# Patient Record
Sex: MALE | Race: WHITE
[De-identification: names, ages, dates, MRNs, and addresses within clinical notes are randomized per-mention and may not be internally consistent; named-entity substitution may affect disease eponyms.]

---

## 2017-09-03 ENCOUNTER — HOSPITAL ENCOUNTER (EMERGENCY)
Dept: HOSPITAL 62 - ER | Age: 27
Discharge: HOME | End: 2017-09-03
Payer: MEDICAID

## 2017-09-03 VITALS — SYSTOLIC BLOOD PRESSURE: 109 MMHG | DIASTOLIC BLOOD PRESSURE: 61 MMHG

## 2017-09-03 DIAGNOSIS — M25.512: Primary | ICD-10-CM

## 2017-09-03 DIAGNOSIS — F17.200: ICD-10-CM

## 2017-09-03 PROCEDURE — 99283 EMERGENCY DEPT VISIT LOW MDM: CPT

## 2017-09-03 PROCEDURE — 73030 X-RAY EXAM OF SHOULDER: CPT

## 2017-09-03 PROCEDURE — 96372 THER/PROPH/DIAG INJ SC/IM: CPT

## 2017-09-03 NOTE — ER DOCUMENT REPORT
HPI





- HPI


Pain Level: 4


Notes: 





Patient is a 27-year-old male who presents the ED complaining of left shoulder 

pain 1 week.  Patient states that he woke up and he was having trouble moving 

his arm.  Patient denies any injury or neck pain.  Patient states that he is 

able to move his shoulder minimally, but has the most difficulty with 

abduction.  Patient states that he has no problems from his elbow distal.  

Patient states that he is a pain when he presses near his deltoid.  He has not 

noticed any swelling or bruising to the area.  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 

cement for a living and has been having difficulties with that over the last 

week.  Patient denies any insect bites or recent illness.  He does admit to 

smoking, but denies any IV drug use.  Denies any headache, fever, neck pain, URI

, sore throat, chest pain, palpitations, syncope, cough, shortness of breath, 

wheeze, dyspnea, abdominal pain, nausea/vomiting/diarrhea, urinary retention, 

dysuria, hematuria, numbness/tingling, saddle anesthesia, or rash.





- ROS


Notes: 





REVIEW OF SYSTEMS:


CONSTITUTIONAL :  Denies fever,  chills, or sweats.  Denies recent illness.


EENT:   Denies eye, ear, throat, or mouth pain or symptoms.  Denies nasal or 

sinus congestion or discharge.  Denies throat, tongue, or mouth swelling or 

difficulty swallowing.


CARDIOVASCULAR:  Denies chest pain.  Denies palpitations or racing or irregular 

heart beat.  Denies ankle edema.


RESPIRATORY:  Denies cough, cold, or chest congestion.  Denies shortness of 

breath, difficulty breathing, or wheezing.


GASTROINTESTINAL:  Denies abdominal pain or distention.  Denies nausea, vomiting

, or diarrhea.  Denies blood in vomitus, stools, or per rectum.  Denies black, 

tarry stools.  Denies constipation.  


GENITOURINARY:  Denies difficulty urinating, painful urination, burning, 

frequency, blood in urine, or discharge.


MUSCULOSKELETAL:  see hpi


SKIN:   Denies rash, lesions or sores.


NEUROLOGICAL:  Denies confusion or altered mental status.  Denies passing out 

or loss of consciousness.  Denies dizziness or lightheadedness.  Denies 

headache.  Denies weakness or paralysis or loss of use of either side.  Denies 

problems with gait or speech.  Denies sensory loss, numbness, or tingling.  





ALL OTHER SYSTEMS REVIEWED AND NEGATIVE.





Dictation was performed using Dragon voice recognition software





- DERM


Skin Color: Normal





Past Medical History





- Social History


Smoking Status: Current Every Day Smoker


Family History: DM


Patient has suicidal ideation: No


Patient has homicidal ideation: No


Renal/ Medical History: Denies: Hx Peritoneal Dialysis





- Immunizations


Hx Diphtheria, Pertussis, Tetanus Vaccination: No





Vertical Provider Document





- CONSTITUTIONAL


Agree With Documented VS: Yes


Notes: 


PHYSICAL EXAMINATION:





GENERAL: Well-appearing, well-nourished and in no acute distress.





HEAD: Atraumatic, normocephalic.





EYES: Pupils equal round and reactive to light, extraocular movements intact, 

sclera anicteric, conjunctiva are normal.





ENT: EAC clear b/l.  TM's intact b/l without erythema, fluid, or perforation.  

Nares patent and without discharge.  oropharynx clear without exudates.  No 

tonsilar hypertrophy or erythema.  Moist mucous membranes.  No sinus tenderness.





NECK: Normal range of motion, supple without lymphadenopathy.  No rigidity/

meningismus.  Non-tender.  Spurling negative.





LUNGS: Breath sounds clear to auscultation bilaterally and equal.  No wheezes 

rales or rhonchi.





HEART: Regular rate and rhythm without murmurs, rubs, gallops.





Musculoskeletal: Lt shoulder:  FROM to passive.  Pt not even trying to contract 

his deltoid for active ROM, but he can flex his bicep w/full strength and reach 

behind himself and touch the mid back.  His arm does Abduct when he starts to 

reach for his midback indicating that he has the ability to at least minimally 

contract his deltoid.  No RC deficit, but pt did not keep his arm in abduction 

for empty can test.  + mild tenderness to the deltoid bursa area.  No other 

bony tenderness.  No clicking/locking in the shoulder appreciated.  Reflexes 2+ 

b/l.  N/V intact distal.  No deficits or issues from the elbow-distal.  





Extremities:  No cyanosis, clubbing, or edema b/l.  Peripheral pulses 2+.  

Capillary refill less than 3 seconds.





NEUROLOGICAL: Normal speech, normal gait.  Normal sensory, motor exams 





PSYCH: Normal mood, normal affect.





SKIN: Warm, Dry, normal turgor, no rashes or lesions noted.








- INFECTION CONTROL


TRAVEL OUTSIDE OF THE [place] IN LAST 30 DAYS: No





- RESPIRATORY


O2 Sat by Pulse Oximetry: 100





Course





- Re-evaluation


Re-evalutation: 





09/03/17 17:37


Patient is an afebrile, well-hydrated, 27-year-old male who presents the ED 

with left shoulder pain, suspect deltoid bursitis at this time based on H&P.  

Vitals are stable.  PE otherwise unremarkable.  Left shoulder x-ray was 

unremarkable for any acute pathology at this time.  Refer to patient's exam.  I 

suspect that he is able to move his shoulder more than he is willing to do in 

the exam today.  Patient only had point tenderness at the deltoid bursa.  I 

have a low suspicion for any septic joint, tendon rupture, cervical etiology, 

disc herniation causing severe spinal stenosis, ACS, PE, dissection, 

pneumothorax, spinal abscess, meningitis, sepsis, or other emergent systemic 

condition at this time.  Patient is aware that his condition can change from 

initial presentation and he needs to monitor symptoms closely and seek medical 

attention if any acute changes.  I will send him home with a temporary sling to 

use as directed.  Conservative measures otherwise for symptoms.  Recheck with 

your PCM this week.  Consider consult with orthopedics/physical therapy for 

further evaluation.  Return to the ED with any worsening/concerning symptoms 

otherwise as reviewed in discharge.  Patient is in agreement.











- Vital Signs


Vital signs: 


 











Temp Pulse Resp BP Pulse Ox


 


 97.7 F   80   16   109/61   100 


 


 09/03/17 16:03  09/03/17 16:03  09/03/17 16:03  09/03/17 16:03  09/03/17 16:03














Discharge





- Discharge


Clinical Impression: 


Left shoulder pain


Qualifiers:


 Chronicity: acute Qualified Code(s): M25.512 - Pain in left shoulder





Condition: Stable


Disposition: HOME, SELF-CARE


Instructions:  Ice & Elevation ([facility]), Exercise Program for the Shoulder ([facility]), 

Temporary Sling ([facility]), Warm Packs ([facility])


Additional Instructions: 


Rest, Ice, Compression, Elevation


Use sling as directed


Tylenol/ibuprofen as needed


Light stretches daily


Strength exercises as able


Moist heat and massage may help


F/u with your PCP in 2-3 days for a recheck


Consider consult(s) with Orthopedics/physical therapy for ongoing/worsening 

symptoms





Return to the ED with any worsening symptoms and/or development of fever, 

headache, chest pain, palpitations, syncope, shortness of breath, trouble 

breathing, abdominal pain, n/v/d, muscle weakness/paralysis, numbness/tingling, 

swelling, redness, or other worsening symptoms that are concerning to you.


Forms:  Smoking Cessation Education


Referrals: 


[facility] [Provider Group] - 09/26/17

## 2017-09-03 NOTE — RADIOLOGY REPORT (SQ)
EXAM DESCRIPTION:  SHOULDER LEFT 2 OR MORE VIEWS



COMPLETED DATE/TIME:  9/3/2017 5:13 pm



REASON FOR STUDY:  left shoulder pain



COMPARISON:  None.



NUMBER OF VIEWS:  Three views.



TECHNIQUE:  Internal rotation, external rotation, and Y view images acquired of the left shoulder.



LIMITATIONS:  None.



FINDINGS:  MINERALIZATION: Normal.

BONES: No acute fracture or dislocation. No worrisome bone lesions. No significant osteophytes.

GLENOHUMERAL JOINT: No significant findings.

ACROMIOCLAVICULAR JOINT: No large osteophytes.

SOFT TISSUES: No calcifications.

VISUALIZED RIBS, SPINE, AND LUNG: No other significant finding.

OTHER: No other significant finding.



IMPRESSION:  NEGATIVE STUDY OF THE LEFT SHOULDER. NO RADIOGRAPHIC EVIDENCE OF ACUTE INJURY. NO EXPLAN
ATION FOR PAIN.



TECHNICAL DOCUMENTATION:  JOB ID:  [id number]

 2011 [facility]- All Rights Reserved

## 2017-10-18 ENCOUNTER — HOSPITAL ENCOUNTER (EMERGENCY)
Dept: HOSPITAL 62 - ER | Age: 27
LOS: 1 days | Discharge: ANOTHER HOSPITAL | End: 2017-10-19
Payer: COMMERCIAL

## 2017-10-18 VITALS — SYSTOLIC BLOOD PRESSURE: 130 MMHG | DIASTOLIC BLOOD PRESSURE: 65 MMHG

## 2017-10-18 DIAGNOSIS — Z88.5: ICD-10-CM

## 2017-10-18 DIAGNOSIS — M65.9: Primary | ICD-10-CM

## 2017-10-18 DIAGNOSIS — Z91.040: ICD-10-CM

## 2017-10-18 DIAGNOSIS — M79.644: ICD-10-CM

## 2017-10-18 PROCEDURE — 96376 TX/PRO/DX INJ SAME DRUG ADON: CPT

## 2017-10-18 PROCEDURE — 99284 EMERGENCY DEPT VISIT MOD MDM: CPT

## 2017-10-18 PROCEDURE — 96375 TX/PRO/DX INJ NEW DRUG ADDON: CPT

## 2017-10-18 PROCEDURE — 96365 THER/PROPH/DIAG IV INF INIT: CPT

## 2017-10-18 NOTE — ER DOCUMENT REPORT
ED Extremity Problem, Upper





- General


Chief Complaint: Hand Swelling


Stated Complaint: FINGER PAIN/SWELLING


Time Seen by Provider: 10/18/17 22:20


Notes: 


Patient is a 27-year-old male comes emergency department for chief complaint of 

swollen and painful right fifth finger, he states that 4 days ago there was 

what appeared to be a pocket of yellow over the side of his finger, he tried to 

cut this open with a knife, a small amount of pus came out, he states since 

that time the finger has become progressively red, swollen, painful, and now he 

cannot straighten the finger.  Now he has pain and swelling to his hand and 

pain shooting up his arm towards his right elbow.  He is up-to-date on his 

tetanus.  He denies any daily medications or surgeries.


TRAVEL OUTSIDE OF THE [place] IN LAST 30 DAYS: No





- Related Data


Allergies/Adverse Reactions: 


 





latex [Latex] Allergy (Intermediate, Verified 11/03/12 10:56)


 


morphine [Morphine] Allergy (Verified 11/03/12 10:56)


 











Past Medical History





- General


Information source: Patient





- Social History


Smoking Status: Never Smoker


Frequency of alcohol use: None


Drug Abuse: None


Lives with: Family


Family History: DM


Patient has suicidal ideation: No


Patient has homicidal ideation: No





- Medical History


Medical History: Negative


Renal/ Medical History: Denies: Hx Peritoneal Dialysis


Surgical Hx: Negative





- Immunizations


Immunizations up to date: Yes


Hx Diphtheria, Pertussis, Tetanus Vaccination: Yes





Review of Systems





- Review of Systems


Constitutional: No symptoms reported


EENT: No symptoms reported


Cardiovascular: No symptoms reported


Respiratory: No symptoms reported


Gastrointestinal: No symptoms reported


Genitourinary: No symptoms reported


Male Genitourinary: No symptoms reported


Musculoskeletal: See HPI


Skin: See HPI


Hematologic/Lymphatic: No symptoms reported


Neurological/Psychological: No symptoms reported





Physical Exam





- Vital signs


Vitals: 


 











Temp Pulse Resp BP Pulse Ox


 


 98.7 F   89   18   123/69   98 


 


 10/18/17 21:10  10/18/17 21:10  10/18/17 21:10  10/18/17 21:10  10/18/17 21:10











Interpretation: Normal





- General


General appearance: Alert, Anxious


In distress: None





- HEENT


Head: Normocephalic, Atraumatic


Eyes: Normal


Pupils: PERRL





- Respiratory


Respiratory status: No respiratory distress


Chest status: Nontender


Breath sounds: Normal


Chest palpation: Normal





- Cardiovascular


Rhythm: Regular


Heart sounds: Normal auscultation


Murmur: No





- Abdominal


Inspection: Normal


Distension: No distension


Bowel sounds: Normal


Tenderness: Nontender


Organomegaly: No organomegaly





- Back


Back: Normal, Nontender





- Extremities


General upper extremity: Other - Erythema and swelling over the entire right 

fifth digit, small scab over the side of the finger just distal to the DIP 

location, finger is in flexion, large amount of pain with palpation or movement 

of the area, sensation and capillary refill intact, tenderness extends to the 

palm of the hand with questionable swelling over the palm of the hand as well.  

Normal wrist, elbow, upper extremity exam otherwise.


General lower extremity: Normal inspection, Nontender, Normal ROM, Normal 

strength





- Neurological


Neuro grossly intact: Yes


Cognition: Normal


Orientation: AAOx4


[place] Coma Scale Eye Opening: Spontaneous


[place] Coma Scale Verbal: Oriented


[place] Coma Scale Motor: Obeys Commands


[place] Coma Scale Total: 15


Speech: Normal


Motor strength normal: LUE, RUE, LLE, RLE


Sensory: Normal





- Psychological


Associated symptoms: Normal affect, Normal mood





- Skin


Skin Temperature: Warm


Skin Moisture: Dry


Skin Color: Normal





Course





- Re-evaluation


Re-evalutation: 


Examination is very concerning for flexor tenosynovitis with partial flexion, 

severe pain, swelling, redness, pain with passive range of motion.  Patient 

kept n.p.o., will be given dose of antibiotics, will consult orthopedics. 

Discussed with Dr. Rod.





10/18/17 22:45


No Orthopedics on call. Called [facility], pending call back.





Spoke with Dr. rao, orthopedic surgeon, recommendation is to have the 

patient accepted at [facility] where he will be transferred [facility] 

pending probable surgery tomorrow.  I spoke with patient in detail in regards 

to this, patient and significant other are in full agreement with this plan.





10/19/17 00:18


Patient was reassessed, asking for additional pain medication but otherwise is 

unchanged.  Examination of the hand is unchanged.  We have a room assignment, 

transport close by, stable for transport.





10/19/17 01:36


EMS is here, evaluated again at bedside, patient is much more comfortable, 

patient stable for transfer.





- Vital Signs


Vital signs: 


 











Temp Pulse Resp BP Pulse Ox


 


 98.6 F   80   18   130/65 H  99 


 


 10/19/17 00:51  10/18/17 23:32  10/19/17 00:51  10/18/17 23:32  10/19/17 00:51














Discharge





- Discharge


Clinical Impression: 


 Flexor tenosynovitis of finger





Condition: Stable


Disposition: [facility]

## 2018-02-01 ENCOUNTER — HOSPITAL ENCOUNTER (EMERGENCY)
Dept: HOSPITAL 62 - ER | Age: 28
LOS: 1 days | Discharge: HOME | End: 2018-02-02
Payer: COMMERCIAL

## 2018-02-01 DIAGNOSIS — L03.116: ICD-10-CM

## 2018-02-01 DIAGNOSIS — Z91.040: ICD-10-CM

## 2018-02-01 DIAGNOSIS — F17.200: ICD-10-CM

## 2018-02-01 DIAGNOSIS — Z86.14: ICD-10-CM

## 2018-02-01 DIAGNOSIS — Z88.6: ICD-10-CM

## 2018-02-01 DIAGNOSIS — L02.416: Primary | ICD-10-CM

## 2018-02-01 PROCEDURE — 99282 EMERGENCY DEPT VISIT SF MDM: CPT

## 2018-02-01 NOTE — ER DOCUMENT REPORT
ED Skin Rash/Insect Bite/Abscs





- General


Chief Complaint: Abscess


Stated Complaint: LEG PAIN


Time Seen by Provider: 02/01/18 23:32


Mode of Arrival: Ambulatory


Information source: Patient


Notes: 





  Patient is a 27-year-old maleWith a history of MRSA who presents to the ER 

today for pain, swelling, redness and drainage above the left knee to the left 

lateral thigh 3 days.  Patient states that it is draining clear fluid.  He 

denies any fevers or chills.  He states that he feels like the swelling has 

extended into his calf.


TRAVEL OUTSIDE OF THE [place] IN LAST 30 DAYS: No





- Related Data


Allergies/Adverse Reactions: 


 





latex [Latex] Allergy (Intermediate, Verified 02/01/18 21:11)


 


morphine [Morphine] Allergy (Verified 02/01/18 21:11)


 











Past Medical History





- General


Information source: Patient





- Social History


Smoking Status: Current Every Day Smoker


Family History: DM


Renal/ Medical History: Denies: Hx Peritoneal Dialysis





- Immunizations


Immunizations up to date: Yes


Hx Diphtheria, Pertussis, Tetanus Vaccination: Yes





Review of Systems





- Review of Systems


Constitutional: No symptoms reported


EENT: No symptoms reported


Cardiovascular: No symptoms reported


Respiratory: No symptoms reported


Gastrointestinal: No symptoms reported


Genitourinary: No symptoms reported


Male Genitourinary: No symptoms reported


Musculoskeletal: No symptoms reported


Skin: See HPI


Hematologic/Lymphatic: No symptoms reported


Neurological/Psychological: No symptoms reported





Physical Exam





- Vital signs


Vitals: 


 











Temp Pulse Resp BP Pulse Ox


 


 98.7 F   89   16   114/86 H  100 


 


 02/02/18 00:34  02/02/18 00:34  02/02/18 00:34  02/02/18 00:34  02/02/18 00:34














- Notes


Notes: 





  PHYSICAL EXAMINATION: 


GENERAL:Uncomfortable appearing, but in no acute distress. 


HEAD: Atraumatic, normocephalic. 


EYES: Pupils equal round and reactive to light, extraocular movements intact, 

sclera anicteric, conjunctiva are normal. 


NECK: Normal range of motion, supple without lymphadenopathy 


LUNGS: CTAB and equal. No wheezes rales or rhonchi. 


HEART: Regular rate and rhythm without murmurs


EXTREMITIES: Normal range of motion, no pitting edema. No cyanosis. 


NEUROLOGICAL: Cranial nerves grossly intact. Normal sensory/motor exams. 


PSYCH: Normal mood, normal affect. 


SKIN: Warm, Dry, normal turgor,2cm in diameter fluctuance with erythema to left 

distal lateral thigh with clear/yellow drainage, tender to palpation, warm, no 

surrounding erythema

















Course





- Re-evaluation


Re-evalutation: 





02/02/18 01:21


I advised pt to get I&D, he initially agreed, then declined. He kept stating "I 

don't want to get cut" over and over. I will place him on bactrim but have 

advised that he return here for incision and drainage if in 3 days it is the 

same or worse. Pt agrees. 


02/02/18 01:22








- Vital Signs


Vital signs: 


 











Temp Pulse Resp BP Pulse Ox


 


 98.7 F   89   16   114/86 H  100 


 


 02/02/18 00:34  02/02/18 00:34  02/02/18 00:34  02/02/18 00:34  02/02/18 00:34














Discharge





- Discharge


Clinical Impression: 


 Cellulitis and abscess of leg





Condition: Stable


Disposition: HOME, SELF-CARE


Instructions:  Oral Narcotic Medication ([facility]), Trimethoprim-Sulfa ([facility])


Additional Instructions: 


Return immediately for any new or worsening symptoms.





Follow up with primary care provider, call tomorrow to make followup 

appointment.


Prescriptions: 


Sulfamethoxazole/Trimethoprim [Bactrim Ds Tablet] 1 each PO BID #20 tablet


Forms:  Return to Work

## 2018-02-02 VITALS — SYSTOLIC BLOOD PRESSURE: 114 MMHG | DIASTOLIC BLOOD PRESSURE: 86 MMHG
